# Patient Record
(demographics unavailable — no encounter records)

---

## 2025-02-20 NOTE — ADDENDUM
[FreeTextEntry1] : Patient was seen and examined by me, Dr. Davida Ferrara. I reviewed and agreed with the findings and plan as documented in the [facility] note, unless noted below.

## 2025-02-20 NOTE — RISK ASSESSMENT
[Clinical Interview] : Clinical Interview [Collateral Sources] : Collateral Sources [None in the patient's lifetime] : None in the patient's lifetime [No known risk factors] : No known risk factors [Relationship stability] : relationship stability [Affective stability] : affective stability [Yes] : yes [No known suicide factors] : No known suicide factors [None known] : None known [Identifies reasons for living] : identifies reasons for living [Supportive social network of family or friends] : supportive social network of family or friends [Ability to cope with stress] : ability to cope with stress [Responsibility to children, family, or others] : responsibility to children, family, or others [Engaged in work or school] : engaged in work or school [None Known] : none known [No] : no [FreeTextEntry1] : Patient denied hx of suicidal ideation and NSSIB [de-identified] : Mother denied presence of firearms in household

## 2025-02-20 NOTE — PHYSICAL EXAM
[Cooperative] : cooperative [Euthymic] : euthymic [Full] : full [Clear] : clear [Linear/Goal Directed] : linear/goal directed [None Reported] : none reported [Average] : average [WNL] : within normal limits [Positive interaction] : positive interaction [Unremarkable/age appropriate] : unremarkable/age appropriate [Normal] : normal [None] : none

## 2025-02-20 NOTE — DISCUSSION/SUMMARY
[Low acute suicide risk] : Low acute suicide risk [No] : No [Not clinically indicated] : Safety Plan completed/updated (for individuals at risk): Not clinically indicated [FreeTextEntry1] : At present, patient has a low risk of harm to self.  Although patient has risk factors including male gender, anxiety symptoms, not currently in treatment, significant psychosocial stressors;  patient has significant protective factors including strong family/social support, age, lack of prior self-harm, no suicide attempts, no substance use, no wisam, no psychosis, no CAH, no psychiatric hospitalization, current willingness to engage in treatment, future orientation with long & short term goals for the future, hopeful, help-seeking, engaged in school & activities, current denial of any SIIP or urges to self-harm, no reported hx of abuse, no aggression/violence, no access to guns/family is able to means restrict, no legal history.

## 2025-02-20 NOTE — REASON FOR VISIT
[Behavioral Health Urgent Care Assessment] : a behavioral health urgent care assessment [School] : school [Patient] : patient [Self] : alone [Mother] : with mother [TextBox_17] : Situational stressors

## 2025-02-20 NOTE — HISTORY OF PRESENT ILLNESS
[Not Applicable] : Not applicable [FreeTextEntry1] : Patient is a 10 y/o male, domiciled with mother, older sister (13), older brother (12), currently enrolled at [employer], 5th grade general education, not currently in outpatient treatment, no formal PPH, no prior psychiatric hospitalization, no self-injury or suicide attempts, no aggression/violence, no substance use, no legal issues, no CPS involvement, +trauma (father incarcerated), no abuse, no PMH, presenting today with mother who was referred by school for connection to resources.  [facility] intern met with the patient for an initial interview. The patient presented as friendly, cooperative, and polite.  When asked about recent events at home, the patient reported that his father has been incarcerated for several months. He was unaware of the reason for the incarceration. The patient also described a recent move to a new house. Despite these events, he endorsed a positive relationship with both parents, explaining that he is in regular contact with his father and expressed excitement for his return. He denied household conflicts and described his relationships with his mother and siblings as positive.  The patient denied depressive symptoms such as persistent sadness, anhedonia, low motivation, hopelessness, changes in energy and concentration, or changes in appetite. He endorsed sleep disturbances, describing difficulty falling asleep and nighttime awakenings. When he wakes, his mother gives him magnesium and plays soothing music, which helps him return to sleep. He denied morning fatigue, describing himself as a "morning person." He does nap after school for a few hours, which he believes might contribute to his difficulty sleeping through the night. He denied any history of suicidal ideation or self-harm.  The patient also denied general anxiety symptoms such as persistent worrying, inability to relax, restlessness, feeling on edge, and difficulty concentrating. He endorsed occasional worry about school performance, specifically math, which he finds challenging. He feels supported at school, is able to ask his teacher for help, and attends extra math help.  The patient denied school attendance problems or school avoidance this year. He described being engaged with his peers both in and outside of school, enjoying online videogames with friends and playing basketball at the local gym when possible. The patient denied any history of physical or sexual abuse.  Patient's mother provided collateral. School consent was obtained and contacted regarding the outcome of today's evaluation. Mother endorsed wanting pursuit of outpatient therapeutic services after the series of adjustments the patient has experienced over the last few years. Mother reported that the patient's father and herself  about three years ago. She reported that the father is still an active participant in the patient's life despite currently being incarcerated. (Details unknown but mother endorsed that it was related to matters pertaining to ICE). Ever since his father was incarcerated a few months back, mother endorsed that the patient has presented with more symptoms of anxiety that include the need for consistent reassurance and increased urination during bouts of nervousness. Additionally, mother reported that the family has overcome some housing difficulties but is now at a stable situation as mother endorsed obtaining employment and looking to move into a home of their own in the near future. She reports the patient's mood is relatively stable as evidenced by most being content/neutral despite being irritable when his stressors are high. Patient has never voiced any SI/HI.  Mother denies any aggression or oppositional behavior. Mother denies any symptoms of wisam or psychosis. She denied any current substance usage in the home. She denied any acute safety concerns at this time and denied any present concerns with the patient as it comes to SI/HI.  [FreeTextEntry2] : Patient has not had any past psychiatric visits, hospitalizations, medication trials or visits with a therapist. No hx suicidality, suicide attempts or self injurious behaviors.    [FreeTextEntry3] : None

## 2025-02-20 NOTE — PLAN
[Contact was Attempted] : contact was attempted [Provision of National Suicide Prevention Lifeline 4-816-173-TALK (3913)] : Provision of national suicide prevention lifeline 0-189-799-talk (3806) [Family] : family [None on Record] : none on record [TextBox_9] : Linkage to individual and family therapy.  [TextBox_11] : not currently indicated  [TextBox_13] :  no safety concerns at this time. no guns at home. family should call 911 or go to nearest ER or any acute safety concerns. [TextBox_26] : school consent provided and contacted regarding the above recommendation.

## 2025-02-20 NOTE — HISTORY OF PRESENT ILLNESS
[Not Applicable] : Not applicable [FreeTextEntry1] : Patient is a 10 y/o male, domiciled with mother, older sister (13), older brother (12), currently enrolled at [employer], 5th grade general education, not currently in outpatient treatment, no formal PPH, no prior psychiatric hospitalization, no self-injury or suicide attempts, no aggression/violence, no substance use, no legal issues, no CPS involvement, +trauma (father incarcerated), no abuse, no PMH, presenting today with mother who was referred by school for connection to resources.  [facility] intern met with the patient for an initial interview. The patient presented as friendly, cooperative, and polite.  When asked about recent events at home, the patient reported that his father has been incarcerated for several months. He was unaware of the reason for the incarceration. The patient also described a recent move to a new house. Despite these events, he endorsed a positive relationship with both parents, explaining that he is in regular contact with his father and expressed excitement for his return. He denied household conflicts and described his relationships with his mother and siblings as positive.  The patient denied depressive symptoms such as persistent sadness, anhedonia, low motivation, hopelessness, changes in energy and concentration, or changes in appetite. He endorsed sleep disturbances, describing difficulty falling asleep and nighttime awakenings. When he wakes, his mother gives him magnesium and plays soothing music, which helps him return to sleep. He denied morning fatigue, describing himself as a "morning person." He does nap after school for a few hours, which he believes might contribute to his difficulty sleeping through the night. He denied any history of suicidal ideation or self-harm.  The patient also denied general anxiety symptoms such as persistent worrying, inability to relax, restlessness, feeling on edge, and difficulty concentrating. He endorsed occasional worry about school performance, specifically math, which he finds challenging. He feels supported at school, is able to ask his teacher for help, and attends extra math help.  The patient denied school attendance problems or school avoidance this year. He described being engaged with his peers both in and outside of school, enjoying online videogames with friends and playing basketball at the local gym when possible. The patient denied any history of physical or sexual abuse.  Patient's mother provided collateral. School consent was obtained and contacted regarding the outcome of today's evaluation. Mother endorsed wanting pursuit of outpatient therapeutic services after the series of adjustments the patient has experienced over the last few years. Mother reported that the patient's father and herself  about three years ago. She reported that the father is still an active participant in the patient's life despite currently being incarcerated. (Details unknown but mother endorsed that it was related to matters pertaining to ICE). Ever since his father was incarcerated a few months back, mother endorsed that the patient has presented with more symptoms of anxiety that include the need for consistent reassurance and increased urination during bouts of nervousness. Additionally, mother reported that the family has overcome some housing difficulties but is now at a stable situation as mother endorsed obtaining employment and looking to move into a home of their own in the near future. She reports the patient's mood is relatively stable as evidenced by most being content/neutral despite being irritable when his stressors are high. Patient has never voiced any SI/HI.  Mother denies any aggression or oppositional behavior. Mother denies any symptoms of wisam or psychosis. She denied any current substance usage in the home. She denied any acute safety concerns at this time and denied any present concerns with the patient as it comes to SI/HI.  [FreeTextEntry2] : Patient has not had any past psychiatric visits, hospitalizations, medication trials or visits with a therapist. No hx suicidality, suicide attempts or self injurious behaviors.    [FreeTextEntry3] : None

## 2025-02-20 NOTE — RISK ASSESSMENT
[Clinical Interview] : Clinical Interview [Collateral Sources] : Collateral Sources [None in the patient's lifetime] : None in the patient's lifetime [No known risk factors] : No known risk factors [Relationship stability] : relationship stability [Affective stability] : affective stability [Yes] : yes [No known suicide factors] : No known suicide factors [None known] : None known [Identifies reasons for living] : identifies reasons for living [Supportive social network of family or friends] : supportive social network of family or friends [Ability to cope with stress] : ability to cope with stress [Responsibility to children, family, or others] : responsibility to children, family, or others [Engaged in work or school] : engaged in work or school [None Known] : none known [No] : no [FreeTextEntry1] : Patient denied hx of suicidal ideation and NSSIB [de-identified] : Mother denied presence of firearms in household

## 2025-02-20 NOTE — PLAN
[Contact was Attempted] : contact was attempted [Provision of National Suicide Prevention Lifeline 5-318-506-TALK (6299)] : Provision of national suicide prevention lifeline 8-789-666-talk (4356) [Family] : family [None on Record] : none on record [TextBox_9] : Linkage to individual and family therapy.  [TextBox_11] : not currently indicated  [TextBox_13] :  no safety concerns at this time. no guns at home. family should call 911 or go to nearest ER or any acute safety concerns. [TextBox_26] : school consent provided and contacted regarding the above recommendation.

## 2025-02-20 NOTE — SOCIAL HISTORY
[No Known Substance Use] : no known substance use [FreeTextEntry1] : Father currently incarcerated, recent hx of housing instability